# Patient Record
Sex: MALE | Race: WHITE | NOT HISPANIC OR LATINO | Employment: PART TIME | URBAN - NONMETROPOLITAN AREA
[De-identification: names, ages, dates, MRNs, and addresses within clinical notes are randomized per-mention and may not be internally consistent; named-entity substitution may affect disease eponyms.]

---

## 2021-02-14 ENCOUNTER — APPOINTMENT (INPATIENT)
Dept: CT IMAGING | Facility: HOSPITAL | Age: 66
DRG: 376 | End: 2021-02-14
Payer: COMMERCIAL

## 2021-02-14 ENCOUNTER — HOSPITAL ENCOUNTER (INPATIENT)
Facility: HOSPITAL | Age: 66
LOS: 1 days | Discharge: HOME | DRG: 376 | End: 2021-02-15
Attending: EMERGENCY MEDICINE | Admitting: FAMILY MEDICINE
Payer: COMMERCIAL

## 2021-02-14 ENCOUNTER — APPOINTMENT (EMERGENCY)
Dept: CT IMAGING | Facility: HOSPITAL | Age: 66
DRG: 376 | End: 2021-02-14
Payer: COMMERCIAL

## 2021-02-14 DIAGNOSIS — K31.89 MASS OF GASTROESOPHAGEAL JUNCTION: Primary | ICD-10-CM

## 2021-02-14 DIAGNOSIS — Z72.0 TOBACCO ABUSE: ICD-10-CM

## 2021-02-14 DIAGNOSIS — I16.0 HYPERTENSIVE URGENCY: ICD-10-CM

## 2021-02-14 PROBLEM — R07.1 CHEST PAIN ON BREATHING: Status: ACTIVE | Noted: 2021-02-14

## 2021-02-14 PROBLEM — J90 PLEURAL EFFUSION ON RIGHT: Status: ACTIVE | Noted: 2021-02-14

## 2021-02-14 LAB
ALBUMIN SERPL BCP-MCNC: 3.2 G/DL (ref 3.5–5)
ALP SERPL-CCNC: 81 U/L (ref 46–116)
ALT SERPL W P-5'-P-CCNC: 18 U/L (ref 12–78)
ANION GAP SERPL CALCULATED.3IONS-SCNC: 10 MMOL/L (ref 4–13)
APTT PPP: 35 SECONDS (ref 23–37)
AST SERPL W P-5'-P-CCNC: 10 U/L (ref 5–45)
BASOPHILS # BLD AUTO: 0.08 THOUSANDS/ΜL (ref 0–0.1)
BASOPHILS NFR BLD AUTO: 1 % (ref 0–1)
BILIRUB SERPL-MCNC: 0.41 MG/DL (ref 0.2–1)
BILIRUB UR QL STRIP: NEGATIVE
BUN SERPL-MCNC: 9 MG/DL (ref 5–25)
CALCIUM ALBUM COR SERPL-MCNC: 9 MG/DL (ref 8.3–10.1)
CALCIUM SERPL-MCNC: 8.4 MG/DL (ref 8.3–10.1)
CHLORIDE SERPL-SCNC: 102 MMOL/L (ref 100–108)
CLARITY UR: CLEAR
CO2 SERPL-SCNC: 28 MMOL/L (ref 21–32)
COLOR UR: YELLOW
CREAT SERPL-MCNC: 0.95 MG/DL (ref 0.6–1.3)
EOSINOPHIL # BLD AUTO: 0.43 THOUSAND/ΜL (ref 0–0.61)
EOSINOPHIL NFR BLD AUTO: 5 % (ref 0–6)
ERYTHROCYTE [DISTWIDTH] IN BLOOD BY AUTOMATED COUNT: 12.7 % (ref 11.6–15.1)
GFR SERPL CREATININE-BSD FRML MDRD: 83 ML/MIN/1.73SQ M
GLUCOSE SERPL-MCNC: 94 MG/DL (ref 65–140)
GLUCOSE UR STRIP-MCNC: NEGATIVE MG/DL
HCT VFR BLD AUTO: 45.9 % (ref 36.5–49.3)
HGB BLD-MCNC: 15.2 G/DL (ref 12–17)
HGB UR QL STRIP.AUTO: NEGATIVE
IMM GRANULOCYTES # BLD AUTO: 0.02 THOUSAND/UL (ref 0–0.2)
IMM GRANULOCYTES NFR BLD AUTO: 0 % (ref 0–2)
INR PPP: 1.05 (ref 0.84–1.19)
KETONES UR STRIP-MCNC: NEGATIVE MG/DL
LACTATE SERPL-SCNC: 0.8 MMOL/L (ref 0.5–2)
LEUKOCYTE ESTERASE UR QL STRIP: NEGATIVE
LIPASE SERPL-CCNC: 109 U/L (ref 73–393)
LYMPHOCYTES # BLD AUTO: 1.78 THOUSANDS/ΜL (ref 0.6–4.47)
LYMPHOCYTES NFR BLD AUTO: 22 % (ref 14–44)
MAGNESIUM SERPL-MCNC: 2.1 MG/DL (ref 1.6–2.6)
MCH RBC QN AUTO: 30.3 PG (ref 26.8–34.3)
MCHC RBC AUTO-ENTMCNC: 33.1 G/DL (ref 31.4–37.4)
MCV RBC AUTO: 91 FL (ref 82–98)
MONOCYTES # BLD AUTO: 0.92 THOUSAND/ΜL (ref 0.17–1.22)
MONOCYTES NFR BLD AUTO: 11 % (ref 4–12)
NEUTROPHILS # BLD AUTO: 4.87 THOUSANDS/ΜL (ref 1.85–7.62)
NEUTS SEG NFR BLD AUTO: 61 % (ref 43–75)
NITRITE UR QL STRIP: NEGATIVE
NRBC BLD AUTO-RTO: 0 /100 WBCS
PH UR STRIP.AUTO: 6 [PH]
PLATELET # BLD AUTO: 237 THOUSANDS/UL (ref 149–390)
PMV BLD AUTO: 8.6 FL (ref 8.9–12.7)
POTASSIUM SERPL-SCNC: 3.9 MMOL/L (ref 3.5–5.3)
PROT SERPL-MCNC: 7 G/DL (ref 6.4–8.2)
PROT UR STRIP-MCNC: NEGATIVE MG/DL
PROTHROMBIN TIME: 13.5 SECONDS (ref 11.6–14.5)
RBC # BLD AUTO: 5.02 MILLION/UL (ref 3.88–5.62)
SODIUM SERPL-SCNC: 140 MMOL/L (ref 136–145)
SP GR UR STRIP.AUTO: 1.02 (ref 1–1.03)
TROPONIN I SERPL-MCNC: <0.02 NG/ML
TSH SERPL DL<=0.05 MIU/L-ACNC: 5.31 UIU/ML (ref 0.36–3.74)
UROBILINOGEN UR QL STRIP.AUTO: 0.2 E.U./DL
WBC # BLD AUTO: 8.1 THOUSAND/UL (ref 4.31–10.16)

## 2021-02-14 PROCEDURE — 99285 EMERGENCY DEPT VISIT HI MDM: CPT

## 2021-02-14 PROCEDURE — 83605 ASSAY OF LACTIC ACID: CPT | Performed by: EMERGENCY MEDICINE

## 2021-02-14 PROCEDURE — 80053 COMPREHEN METABOLIC PANEL: CPT | Performed by: EMERGENCY MEDICINE

## 2021-02-14 PROCEDURE — 99285 EMERGENCY DEPT VISIT HI MDM: CPT | Performed by: EMERGENCY MEDICINE

## 2021-02-14 PROCEDURE — 71250 CT THORAX DX C-: CPT

## 2021-02-14 PROCEDURE — 99222 1ST HOSP IP/OBS MODERATE 55: CPT | Performed by: FAMILY MEDICINE

## 2021-02-14 PROCEDURE — 84484 ASSAY OF TROPONIN QUANT: CPT | Performed by: FAMILY MEDICINE

## 2021-02-14 PROCEDURE — 83735 ASSAY OF MAGNESIUM: CPT | Performed by: EMERGENCY MEDICINE

## 2021-02-14 PROCEDURE — 84443 ASSAY THYROID STIM HORMONE: CPT | Performed by: FAMILY MEDICINE

## 2021-02-14 PROCEDURE — 85025 COMPLETE CBC W/AUTO DIFF WBC: CPT | Performed by: EMERGENCY MEDICINE

## 2021-02-14 PROCEDURE — 85610 PROTHROMBIN TIME: CPT | Performed by: EMERGENCY MEDICINE

## 2021-02-14 PROCEDURE — C9113 INJ PANTOPRAZOLE SODIUM, VIA: HCPCS | Performed by: FAMILY MEDICINE

## 2021-02-14 PROCEDURE — G1004 CDSM NDSC: HCPCS

## 2021-02-14 PROCEDURE — 83690 ASSAY OF LIPASE: CPT | Performed by: EMERGENCY MEDICINE

## 2021-02-14 PROCEDURE — 36415 COLL VENOUS BLD VENIPUNCTURE: CPT | Performed by: EMERGENCY MEDICINE

## 2021-02-14 PROCEDURE — 85730 THROMBOPLASTIN TIME PARTIAL: CPT | Performed by: EMERGENCY MEDICINE

## 2021-02-14 PROCEDURE — 96374 THER/PROPH/DIAG INJ IV PUSH: CPT

## 2021-02-14 PROCEDURE — 93005 ELECTROCARDIOGRAM TRACING: CPT

## 2021-02-14 PROCEDURE — 81003 URINALYSIS AUTO W/O SCOPE: CPT | Performed by: EMERGENCY MEDICINE

## 2021-02-14 PROCEDURE — 96361 HYDRATE IV INFUSION ADD-ON: CPT

## 2021-02-14 PROCEDURE — 74177 CT ABD & PELVIS W/CONTRAST: CPT

## 2021-02-14 RX ORDER — MULTIVITAMIN WITH IRON
TABLET ORAL DAILY
COMMUNITY
End: 2021-02-15 | Stop reason: HOSPADM

## 2021-02-14 RX ORDER — FENTANYL CITRATE 50 UG/ML
25 INJECTION, SOLUTION INTRAMUSCULAR; INTRAVENOUS EVERY 8 HOURS PRN
Status: DISCONTINUED | OUTPATIENT
Start: 2021-02-14 | End: 2021-02-15 | Stop reason: HOSPADM

## 2021-02-14 RX ORDER — NICOTINE 21 MG/24HR
1 PATCH, TRANSDERMAL 24 HOURS TRANSDERMAL DAILY
Status: DISCONTINUED | OUTPATIENT
Start: 2021-02-15 | End: 2021-02-15 | Stop reason: HOSPADM

## 2021-02-14 RX ORDER — ONDANSETRON 2 MG/ML
4 INJECTION INTRAMUSCULAR; INTRAVENOUS EVERY 6 HOURS PRN
Status: DISCONTINUED | OUTPATIENT
Start: 2021-02-14 | End: 2021-02-15 | Stop reason: HOSPADM

## 2021-02-14 RX ORDER — FENTANYL CITRATE 50 UG/ML
50 INJECTION, SOLUTION INTRAMUSCULAR; INTRAVENOUS 3 TIMES DAILY PRN
Status: DISCONTINUED | OUTPATIENT
Start: 2021-02-14 | End: 2021-02-15 | Stop reason: HOSPADM

## 2021-02-14 RX ORDER — PROPRANOLOL/HYDROCHLOROTHIAZID 40 MG-25MG
TABLET ORAL DAILY
COMMUNITY
End: 2021-02-15 | Stop reason: HOSPADM

## 2021-02-14 RX ORDER — MAGNESIUM HYDROXIDE/ALUMINUM HYDROXICE/SIMETHICONE 120; 1200; 1200 MG/30ML; MG/30ML; MG/30ML
30 SUSPENSION ORAL EVERY 6 HOURS PRN
Status: DISCONTINUED | OUTPATIENT
Start: 2021-02-14 | End: 2021-02-15 | Stop reason: HOSPADM

## 2021-02-14 RX ORDER — ACETAMINOPHEN 325 MG/1
650 TABLET ORAL EVERY 6 HOURS PRN
Status: DISCONTINUED | OUTPATIENT
Start: 2021-02-14 | End: 2021-02-15 | Stop reason: HOSPADM

## 2021-02-14 RX ORDER — PANTOPRAZOLE SODIUM 40 MG/1
40 INJECTION, POWDER, FOR SOLUTION INTRAVENOUS EVERY 12 HOURS SCHEDULED
Status: DISCONTINUED | OUTPATIENT
Start: 2021-02-14 | End: 2021-02-15 | Stop reason: HOSPADM

## 2021-02-14 RX ORDER — HYDROCHLOROTHIAZIDE 12.5 MG/1
12.5 TABLET ORAL DAILY
Status: DISCONTINUED | OUTPATIENT
Start: 2021-02-14 | End: 2021-02-15 | Stop reason: HOSPADM

## 2021-02-14 RX ORDER — CALCIUM CARBONATE 200(500)MG
1000 TABLET,CHEWABLE ORAL DAILY PRN
Status: DISCONTINUED | OUTPATIENT
Start: 2021-02-14 | End: 2021-02-15 | Stop reason: HOSPADM

## 2021-02-14 RX ORDER — LANOLIN ALCOHOL/MO/W.PET/CERES
1.5 CREAM (GRAM) TOPICAL
Status: DISCONTINUED | OUTPATIENT
Start: 2021-02-14 | End: 2021-02-15 | Stop reason: HOSPADM

## 2021-02-14 RX ORDER — ONDANSETRON 2 MG/ML
4 INJECTION INTRAMUSCULAR; INTRAVENOUS ONCE
Status: COMPLETED | OUTPATIENT
Start: 2021-02-14 | End: 2021-02-14

## 2021-02-14 RX ORDER — FENTANYL CITRATE 50 UG/ML
25 INJECTION, SOLUTION INTRAMUSCULAR; INTRAVENOUS EVERY 4 HOURS PRN
Status: DISCONTINUED | OUTPATIENT
Start: 2021-02-14 | End: 2021-02-14

## 2021-02-14 RX ADMIN — PANTOPRAZOLE SODIUM 40 MG: 40 INJECTION, POWDER, FOR SOLUTION INTRAVENOUS at 22:06

## 2021-02-14 RX ADMIN — FENTANYL CITRATE 25 MCG: 50 INJECTION INTRAMUSCULAR; INTRAVENOUS at 22:06

## 2021-02-14 RX ADMIN — IOHEXOL 100 ML: 350 INJECTION, SOLUTION INTRAVENOUS at 14:54

## 2021-02-14 RX ADMIN — MELATONIN TAB 3 MG 1.5 MG: 3 TAB at 22:06

## 2021-02-14 RX ADMIN — SODIUM CHLORIDE 1000 ML: 0.9 INJECTION, SOLUTION INTRAVENOUS at 14:14

## 2021-02-14 RX ADMIN — ONDANSETRON 4 MG: 2 INJECTION INTRAMUSCULAR; INTRAVENOUS at 14:14

## 2021-02-14 RX ADMIN — HYDROCHLOROTHIAZIDE 12.5 MG: 12.5 TABLET ORAL at 17:23

## 2021-02-14 NOTE — H&P
H&P- Reno Fuentes 1955, 76 y o  male MRN: [id number]    Unit/Bed#: -01 Encounter: [id number]    Primary Care Provider: No primary care provider on file  Date and time admitted to hospital: 2/14/2021  1:57 PM        * Mass of gastroesophageal junction  Assessment & Plan  Patient complains of 30 lb weight loss over the last 3-4 months, poor appetite and pain after eating  Labs are normal including CMP and lipase  CT abdomen pelvis shows Enhancing nodular lesion measuring 2 6 x 1 8 cm the gastroesophageal junction on series 2 image 18 concerning for primary or metastatic neoplastic process  Keep NPO after midnight and consult placed to GI for EGD with biopsy of the area  Placed on Protonix 40 mg IV b i d  For now  Chest pain on breathing  Assessment & Plan  Patient complains of right lower chest and right upper abdominal area pain on eating or taking deep breaths  CT abdomen pelvis showed right lower lobe pleural effusion and GE junction nodular mass noted  Will do CT chest without contrast for further evaluation  Check EKG and troponin  Tylenol and fentanyl for pain control  Tobacco abuse  Assessment & Plan  Counseled on smoking cessation and placed on nicotine replacement therapy    Hypertensive urgency  Assessment & Plan  No prior diagnosis of hypertension  Blood pressure is elevated at this time  May be anxiety or uncontrolled or undiagnosed hypertension  Will initiate on HCTZ 12 5 mg oral daily and further adjust medications to better control blood pressure  VTE Prophylaxis: Enoxaparin (Lovenox)  / sequential compression device   Code Status:  Full code  POLST: There is no POLST form on file for this patient (pre-hospital)  Discussion with family:  Discussed with daughter    Anticipated Length of Stay:  Patient will be admitted on an Inpatient basis with an anticipated length of stay of  more than 2 midnights     Justification for Hospital Stay:  Right lower chest pain and unintentional weight loss    Total Time for Visit, including Counseling / Coordination of Care: 45 minutes  Greater than 50% of this total time spent on direct patient counseling and coordination of care  Chief Complaint:   Chest pain and abdominal pain    History of Present Illness:    Teodoro Claudio is a 76 y o  male who presents with chest pain and abdominal pain  Patient states that over the last 3-4 months he has noticed that he has poor appetite  Appears to be losing weight and has lost around 30 lb in the last 3-4 months  He also complains of pain and pressure after eating on the right upper part of the abdomen right lower chest area  Came to the ER to be evaluated today has the right sided pain has been constant for the last 3 weeks  Denies any fevers chills nausea vomiting or diarrhea  Had colonoscopies done on a regular basis every 5 years and never had endoscopy done  Prior history of heartburn treated with Zantac  Denies any NSAID abuse    Review of Systems:    Review of Systems   Constitutional: Positive for appetite change, fatigue and unexpected weight change  Negative for chills and fever  HENT: Negative for hearing loss, sore throat and trouble swallowing  Eyes: Negative for photophobia, discharge and visual disturbance  Respiratory: Positive for shortness of breath  Negative for chest tightness  Cardiovascular: Positive for chest pain  Negative for palpitations  Gastrointestinal: Negative for abdominal pain, blood in stool and vomiting  Endocrine: Negative for polydipsia and polyuria  Genitourinary: Negative for difficulty urinating, dysuria, flank pain and hematuria  Musculoskeletal: Negative for back pain and gait problem  Skin: Negative for rash  Allergic/Immunologic: Negative for environmental allergies and food allergies  Neurological: Negative for dizziness, seizures, syncope and headaches  Hematological: Does not bruise/bleed easily  Psychiatric/Behavioral: Negative for behavioral problems  All other systems reviewed and are negative  Past Medical and Surgical History:     History reviewed  No pertinent past medical history  Past Surgical History:   Procedure Laterality Date    PATELLAR TENDON REPAIR      SHOULDER SURGERY         Meds/Allergies:    Prior to Admission medications    Medication Sig Start Date End Date Taking? Authorizing Provider   psyllium (METAMUCIL) 58 6 % packet Take 1 packet by mouth daily   Yes Historical Provider, MD   Magnesium 250 MG TABS Take by mouth daily Patient hasn't been taking for 3 weeks secondary to abdominal pain    Historical Provider, MD   Turmeric 500 MG CAPS Take by mouth daily Patient hasn't been taking for 3 weeks secondary to abdominal pain    Historical Provider, MD     I have reviewed home medications with patient personally  Allergies: Allergies   Allergen Reactions    Codeine Vomiting       Social History:     Marital Status: /Civil Union   Social History     Substance and Sexual Activity   Alcohol Use Not Currently     Social History     Tobacco Use   Smoking Status Current Every Day Smoker    Packs/day: 1 50   Smokeless Tobacco Never Used     Social History     Substance and Sexual Activity   Drug Use Not Currently       Family History:    Family History   Problem Relation Age of Onset    Hypertension Father        Physical Exam:     Vitals:   Blood Pressure: (!) 184/78 (02/14/21 1646)  Pulse: 68 (02/14/21 1638)  Temperature: 98 5 °F (36 9 °C) (02/14/21 1638)  Temp Source: Oral (02/14/21 1551)  Respirations: 20 (02/14/21 1638)  Height: 5' 10" (177 8 cm) (02/14/21 1400)  Weight - Scale: 84 6 kg (186 lb 8 2 oz) (02/14/21 1400)  SpO2: 96 % (02/14/21 1638)    Physical Exam  Vitals signs and nursing note reviewed  Constitutional:       Appearance: Normal appearance  HENT:      Head: Normocephalic and atraumatic        Right Ear: External ear normal       Left Ear: External ear normal       Nose: Nose normal       Mouth/Throat:      Pharynx: Oropharynx is clear  Neck:      Musculoskeletal: Normal range of motion and neck supple  Cardiovascular:      Rate and Rhythm: Normal rate and regular rhythm  Heart sounds: Normal heart sounds  Pulmonary:      Effort: Pulmonary effort is normal       Comments: Good air entry bilaterally with decreased breath sounds on the right base  Abdominal:      General: Bowel sounds are normal       Palpations: Abdomen is soft  Tenderness: There is abdominal tenderness  Comments: Mild tenderness on palpation of the right upper quadrant   Musculoskeletal: Normal range of motion  Skin:     General: Skin is warm and dry  Capillary Refill: Capillary refill takes less than 2 seconds  Neurological:      General: No focal deficit present  Mental Status: He is alert and oriented to person, place, and time  Psychiatric:         Mood and Affect: Mood normal            Additional Data:     Lab Results: I have personally reviewed pertinent reports  Results from last 7 days   Lab Units 02/14/21  1409   WBC Thousand/uL 8 10   HEMOGLOBIN g/dL 15 2   HEMATOCRIT % 45 9   PLATELETS Thousands/uL 237   NEUTROS PCT % 61   LYMPHS PCT % 22   MONOS PCT % 11   EOS PCT % 5     Results from last 7 days   Lab Units 02/14/21  1409   SODIUM mmol/L 140   POTASSIUM mmol/L 3 9   CHLORIDE mmol/L 102   CO2 mmol/L 28   BUN mg/dL 9   CREATININE mg/dL 0 95   ANION GAP mmol/L 10   CALCIUM mg/dL 8 4   ALBUMIN g/dL 3 2*   TOTAL BILIRUBIN mg/dL 0 41   ALK PHOS U/L 81   ALT U/L 18   AST U/L 10   GLUCOSE RANDOM mg/dL 94     Results from last 7 days   Lab Units 02/14/21  1409   INR  1 05             Results from last 7 days   Lab Units 02/14/21  1409   LACTIC ACID mmol/L 0 8       Imaging: I have personally reviewed pertinent reports        CT abdomen pelvis with contrast   Final Result by Clarissa Soares MD (02/14 1530)   Enhancing nodular lesion measuring 2 6 x 1 8 cm the gastroesophageal junction on series 2 image 18 concerning for primary or metastatic neoplastic process  Additionally, there is a small right basilar pleural effusion with some nodular pleural    enhancement raising suspicion for a malignant effusion  Recommend PET CT follow-up to search for additional lesions and then consider tissue sampling  The study was marked in [facility] for immediate notification  Workstation performed: [id number]         CT chest wo contrast    (Results Pending)       EKG, Pathology, and Other Studies Reviewed on Admission:   · EKG:  EKG ordered  Allscripts / Epic Records Reviewed: Yes     ** Please Note: This note has been constructed using a voice recognition system   **

## 2021-02-14 NOTE — ASSESSMENT & PLAN NOTE
Patient complains of 30 lb weight loss over the last 3-4 months, poor appetite and pain after eating  Labs are normal including CMP and lipase  CT abdomen pelvis shows Enhancing nodular lesion measuring 2 6 x 1 8 cm the gastroesophageal junction on series 2 image 18 concerning for primary or metastatic neoplastic process  Keep NPO after midnight and consult placed to GI for EGD with biopsy of the area  Placed on Protonix 40 mg IV b i d  For now

## 2021-02-14 NOTE — ASSESSMENT & PLAN NOTE
Patient complains of right lower chest and right upper abdominal area pain on eating or taking deep breaths  CT abdomen pelvis showed right lower lobe pleural effusion and GE junction nodular mass noted  Will do CT chest without contrast for further evaluation  Check EKG and troponin  Tylenol and fentanyl for pain control

## 2021-02-14 NOTE — ED PROVIDER NOTES
History  Chief Complaint   Patient presents with    Flank Pain     Patient has had pain for three weeks to the posterior right lower flank  Patient complains of right-sided abdominal pain/right flank pain that has been constant for the past 3 weeks  Decreased appetite  Has nausea  States he has lost over 30 lb in the last 1 year however he was in a what he calls a funk when his wife was sick recently  No dysuria or frequency  Balance used to be regular but now not  No cough or cold symptoms  No shortness of breath  States he is nauseated for the past 3 weeks  History provided by:  Patient   used: No    Abdominal Pain  Pain location: Right-sided and right flank  Pain quality: aching    Pain radiates to:  Does not radiate  Pain severity:  Mild  Onset quality:  Gradual  Duration:  3 weeks  Timing:  Constant  Progression:  Waxing and waning  Chronicity:  New  Context: not diet changes, not previous surgeries and not sick contacts    Relieved by:  Nothing  Worsened by:  Nothing  Ineffective treatments:  None tried  Associated symptoms: anorexia and nausea    Associated symptoms: no chest pain, no chills, no cough, no diarrhea, no dysuria, no fatigue, no fever, no hematemesis, no hematochezia, no shortness of breath and no sore throat        None       History reviewed  No pertinent past medical history  History reviewed  No pertinent surgical history  History reviewed  No pertinent family history  I have reviewed and agree with the history as documented  E-Cigarette/Vaping    E-Cigarette Use Never User      E-Cigarette/Vaping Substances     Social History     Tobacco Use    Smoking status: Current Every Day Smoker     Packs/day: 1 50    Smokeless tobacco: Never Used   Substance Use Topics    Alcohol use: Not Currently    Drug use: Not Currently       Review of Systems   Constitutional: Negative for chills, diaphoresis, fatigue and fever     HENT: Negative for congestion, ear discharge, ear pain and sore throat  Eyes: Negative for pain, discharge and itching  Respiratory: Negative for cough, shortness of breath and wheezing  Cardiovascular: Negative for chest pain, palpitations and leg swelling  Gastrointestinal: Positive for abdominal pain, anorexia and nausea  Negative for diarrhea, hematemesis and hematochezia  Endocrine: Negative for polydipsia and polyphagia  Genitourinary: Negative for dysuria and frequency  Musculoskeletal: Negative for back pain and myalgias  Skin: Negative for color change and rash  Neurological: Negative for dizziness, light-headedness and headaches  Hematological: Does not bruise/bleed easily  Psychiatric/Behavioral: Negative for agitation, behavioral problems and confusion  All other systems reviewed and are negative  Physical Exam  Physical Exam  Vitals signs and nursing note reviewed  Constitutional:       Appearance: He is well-developed  HENT:      Head: Normocephalic and atraumatic  Eyes:      Pupils: Pupils are equal, round, and reactive to light  Neck:      Musculoskeletal: Normal range of motion and neck supple  Trachea: No tracheal deviation  Cardiovascular:      Rate and Rhythm: Normal rate and regular rhythm  Heart sounds: Normal heart sounds  Pulmonary:      Effort: Pulmonary effort is normal       Breath sounds: Normal breath sounds  Abdominal:      General: Bowel sounds are normal  There is no distension  Palpations: Abdomen is soft  Tenderness: There is abdominal tenderness (Minimal mild tenderness in right lower quadrant right flank region  )  Musculoskeletal:         General: No tenderness or deformity  Skin:     General: Skin is warm and dry  Capillary Refill: Capillary refill takes less than 2 seconds  Neurological:      Mental Status: He is alert and oriented to person, place, and time  Cranial Nerves: No cranial nerve deficit           Vital Signs  ED Triage Vitals   Temperature Pulse Respirations Blood Pressure SpO2   02/14/21 1551 02/14/21 1400 02/14/21 1400 02/14/21 1400 02/14/21 1400   98 °F (36 7 °C) 74 18 (!) 174/80 95 %      Temp Source Heart Rate Source Patient Position - Orthostatic VS BP Location FiO2 (%)   02/14/21 1551 02/14/21 1400 02/14/21 1400 02/14/21 1400 --   Oral Monitor Lying Left arm       Pain Score       02/14/21 1400       5           Vitals:    02/14/21 1400 02/14/21 1551   BP: (!) 174/80 (!) 171/86   Pulse: 74 63   Patient Position - Orthostatic VS: Lying Lying         Visual Acuity      ED Medications  Medications   sodium chloride 0 9 % bolus 1,000 mL (0 mL Intravenous Stopped 2/14/21 1518)   ondansetron (ZOFRAN) injection 4 mg (4 mg Intravenous Given 2/14/21 1414)   iohexol (OMNIPAQUE) 350 MG/ML injection (MULTI-DOSE) 100 mL (100 mL Intravenous Given 2/14/21 1454)       Diagnostic Studies  Results Reviewed     Procedure Component Value Units Date/Time    Lactic acid [[id number]]  (Normal) Collected: 02/14/21 1409    Lab Status: Final result Specimen: Blood from Arm, Left Updated: 02/14/21 1442     LACTIC ACID 0 8 mmol/L     Narrative:      Result may be elevated if tourniquet was used during collection      Comprehensive metabolic panel [[id number]]  (Abnormal) Collected: 02/14/21 1409    Lab Status: Final result Specimen: Blood from Arm, Left Updated: 02/14/21 1436     Sodium 140 mmol/L      Potassium 3 9 mmol/L      Chloride 102 mmol/L      CO2 28 mmol/L      ANION GAP 10 mmol/L      BUN 9 mg/dL      Creatinine 0 95 mg/dL      Glucose 94 mg/dL      Calcium 8 4 mg/dL      Corrected Calcium 9 0 mg/dL      AST 10 U/L      ALT 18 U/L      Alkaline Phosphatase 81 U/L      Total Protein 7 0 g/dL      Albumin 3 2 g/dL      Total Bilirubin 0 41 mg/dL      eGFR 83 ml/min/1 73sq m     Narrative:      [place] guidelines for Chronic Kidney Disease (CKD):     Stage 1 with normal or high GFR (GFR > 90 mL/min/1 73 square meters)    Stage 2 Mild CKD (GFR = 60-89 mL/min/1 73 square meters)    Stage 3A Moderate CKD (GFR = 45-59 mL/min/1 73 square meters)    Stage 3B Moderate CKD (GFR = 30-44 mL/min/1 73 square meters)    Stage 4 Severe CKD (GFR = 15-29 mL/min/1 73 square meters)    Stage 5 End Stage CKD (GFR <15 mL/min/1 73 square meters)  Note: GFR calculation is accurate only with a steady state creatinine    Lipase [[id number]]  (Normal) Collected: 02/14/21 1409    Lab Status: Final result Specimen: Blood from Arm, Left Updated: 02/14/21 1436     Lipase 109 u/L     Magnesium [[id number]]  (Normal) Collected: 02/14/21 1409    Lab Status: Final result Specimen: Blood from Arm, Left Updated: 02/14/21 1436     Magnesium 2 1 mg/dL     Protime-INR [[id number]]  (Normal) Collected: 02/14/21 1409    Lab Status: Final result Specimen: Blood from Arm, Left Updated: 02/14/21 1433     Protime 13 5 seconds      INR 1 05    APTT [[id number]]  (Normal) Collected: 02/14/21 1409    Lab Status: Final result Specimen: Blood from Arm, Left Updated: 02/14/21 1433     PTT 35 seconds     UA w Reflex to Microscopic w Reflex to Culture [[id number]] Collected: 02/14/21 1409    Lab Status: Final result Specimen: Urine, Clean Catch Updated: 02/14/21 1423     Color, UA Yellow     Clarity, UA Clear     Specific [place], UA 1 020     pH, UA 6 0     Leukocytes, UA Negative     Nitrite, UA Negative     Protein, UA Negative mg/dl      Glucose, UA Negative mg/dl      Ketones, UA Negative mg/dl      Urobilinogen, UA 0 2 E U /dl      Bilirubin, UA Negative     Blood, UA Negative    CBC and differential [[id number]]  (Abnormal) Collected: 02/14/21 1409    Lab Status: Final result Specimen: Blood from Arm, Left Updated: 02/14/21 1421     WBC 8 10 Thousand/uL      RBC 5 02 Million/uL      Hemoglobin 15 2 g/dL      Hematocrit 45 9 %      MCV 91 fL      MCH 30 3 pg      MCHC 33 1 g/dL      RDW 12 7 %      MPV 8 6 fL      Platelets 004 Thousands/uL nRBC 0 /100 WBCs      Neutrophils Relative 61 %      Immat GRANS % 0 %      Lymphocytes Relative 22 %      Monocytes Relative 11 %      Eosinophils Relative 5 %      Basophils Relative 1 %      Neutrophils Absolute 4 87 Thousands/µL      Immature Grans Absolute 0 02 Thousand/uL      Lymphocytes Absolute 1 78 Thousands/µL      Monocytes Absolute 0 92 Thousand/µL      Eosinophils Absolute 0 43 Thousand/µL      Basophils Absolute 0 08 Thousands/µL                  CT abdomen pelvis with contrast   Final Result by Ju Nash MD (02/14 1530)   Enhancing nodular lesion measuring 2 6 x 1 8 cm the gastroesophageal junction on series 2 image 18 concerning for primary or metastatic neoplastic process  Additionally, there is a small right basilar pleural effusion with some nodular pleural    enhancement raising suspicion for a malignant effusion  Recommend PET CT follow-up to search for additional lesions and then consider tissue sampling  The study was marked in [facility] for immediate notification  Workstation performed: [id number]                    Procedures  Procedures         ED Course  ED Course as of Feb 14 1600   Juan J Colemanpee Feb 14, 2021   1538 D? W GI on call, Dr Emma Barrios  Admit to the hospitalist service  Keep NPO  MDM  Number of Diagnoses or Management Options     Amount and/or Complexity of Data Reviewed  Clinical lab tests: reviewed  Review and summarize past medical records: yes        Disposition  Final diagnoses:    Mass of gastroesophageal junction     Time reflects when diagnosis was documented in both MDM as applicable and the Disposition within this note     Time User Action Codes Description Comment    2/14/2021  3:46 PM Jasmine Troncoso Add [K02 77] Mass of gastroesophageal junction       ED Disposition     ED Disposition Condition Date/Time Comment    Admit Stable Mouna Feb 14, 2021  3:47 PM Case was discussed with Dr Evan Maldonado and the patient's admission status was agreed to be to the service of Dr Elise Saha   Follow-up Information    None         Patient's Medications    No medications on file     No discharge procedures on file      PDMP Review     None          ED Provider  Electronically Signed by           Hailey Abreu MD  02/14/21 1301 [facility] Joanne Saavedra MD  02/14/21 [id number]

## 2021-02-14 NOTE — PLAN OF CARE
Problem: PAIN - ADULT  Goal: Verbalizes/displays adequate comfort level or baseline comfort level  Description: Interventions:  - Encourage patient to monitor pain and request assistance  - Assess pain using appropriate pain scale  - Administer analgesics based on type and severity of pain and evaluate response  - Implement non-pharmacological measures as appropriate and evaluate response  - Consider cultural and social influences on pain and pain management  - Notify physician/advanced practitioner if interventions unsuccessful or patient reports new pain  Outcome: Progressing     Problem: INFECTION - ADULT  Goal: Absence or prevention of progression during hospitalization  Description: INTERVENTIONS:  - Assess and monitor for signs and symptoms of infection  - Monitor lab/diagnostic results  - Monitor all insertion sites, i e  indwelling lines, tubes, and drains  - Monitor endotracheal if appropriate and nasal secretions for changes in amount and color  - [place] appropriate cooling/warming therapies per order  - Administer medications as ordered  - Instruct and encourage patient and family to use good hand hygiene technique  - Identify and instruct in appropriate isolation precautions for identified infection/condition  Outcome: Progressing     Problem: SAFETY ADULT  Goal: Patient will remain free of falls  Description: INTERVENTIONS:  - Assess patient frequently for physical needs  -  Identify cognitive and physical deficits and behaviors that affect risk of falls    -  [place] fall precautions as indicated by assessment   - Educate patient/family on patient safety including physical limitations  - Instruct patient to call for assistance with activity based on assessment  - Modify environment to reduce risk of injury  - Consider OT/PT consult to assist with strengthening/mobility  Outcome: Progressing  Goal: Maintain or return to baseline ADL function  Description: INTERVENTIONS:  -  Assess patient's ability to carry out ADLs; assess patient's baseline for ADL function and identify physical deficits which impact ability to perform ADLs (bathing, care of mouth/teeth, toileting, grooming, dressing, etc )  - Assess/evaluate cause of self-care deficits   - Assess range of motion  - Assess patient's mobility; develop plan if impaired  - Assess patient's need for assistive devices and provide as appropriate  - Encourage maximum independence but intervene and supervise when necessary  - Involve family in performance of ADLs  - Assess for home care needs following discharge   - Consider OT consult to assist with ADL evaluation and planning for discharge  - Provide patient education as appropriate  Outcome: Progressing  Goal: Maintain or return mobility status to optimal level  Description: INTERVENTIONS:  - Assess patient's baseline mobility status (ambulation, transfers, stairs, etc )    - Identify cognitive and physical deficits and behaviors that affect mobility  - Identify mobility aids required to assist with transfers and/or ambulation (gait belt, sit-to-stand, lift, walker, cane, etc )  - [place] fall precautions as indicated by assessment  - Record patient progress and toleration of activity level on Mobility SBAR; progress patient to next Phase/Stage  - Instruct patient to call for assistance with activity based on assessment  - Consider rehabilitation consult to assist with strengthening/weightbearing, etc   Outcome: Progressing     Problem: DISCHARGE PLANNING  Goal: Discharge to home or other facility with appropriate resources  Description: INTERVENTIONS:  - Identify barriers to discharge w/patient and caregiver  - Arrange for needed discharge resources and transportation as appropriate  - Identify discharge learning needs (meds, wound care, etc )  - Arrange for interpretive services to assist at discharge as needed  - Refer to Case Management Department for coordinating discharge planning if the patient needs post-hospital services based on physician/advanced practitioner order or complex needs related to functional status, cognitive ability, or social support system  Outcome: Progressing     Problem: Knowledge Deficit  Goal: Patient/family/caregiver demonstrates understanding of disease process, treatment plan, medications, and discharge instructions  Description: Complete learning assessment and assess knowledge base  Interventions:  - Provide teaching at level of understanding  - Provide teaching via preferred learning methods  Outcome: Progressing     Problem: Nutrition/Hydration-ADULT  Goal: Nutrient/Hydration intake appropriate for improving, restoring or maintaining nutritional needs  Description: Monitor and assess patient's nutrition/hydration status for malnutrition  Collaborate with interdisciplinary team and initiate plan and interventions as ordered  Monitor patient's weight and dietary intake as ordered or per policy  Utilize nutrition screening tool and intervene as necessary  Determine patient's food preferences and provide high-protein, high-caloric foods as appropriate       INTERVENTIONS:  - Monitor oral intake, urinary output, labs, and treatment plans  - Assess nutrition and hydration status and recommend course of action  - Evaluate amount of meals eaten  - Assist patient with eating if necessary   - Allow adequate time for meals  - Recommend/ encourage appropriate diets, oral nutritional supplements, and vitamin/mineral supplements  - Order, calculate, and assess calorie counts as needed  - Recommend, monitor, and adjust tube feedings and TPN/PPN based on assessed needs  - Assess need for intravenous fluids  - Provide specific nutrition/hydration education as appropriate  - Include patient/family/caregiver in decisions related to nutrition  Outcome: Progressing     Problem: Nutrition/Hydration-ADULT  Goal: Nutrient/Hydration intake appropriate for improving, restoring or maintaining nutritional needs  Description: Monitor and assess patient's nutrition/hydration status for malnutrition  Collaborate with interdisciplinary team and initiate plan and interventions as ordered  Monitor patient's weight and dietary intake as ordered or per policy  Utilize nutrition screening tool and intervene as necessary  Determine patient's food preferences and provide high-protein, high-caloric foods as appropriate       INTERVENTIONS:  - Monitor oral intake, urinary output, labs, and treatment plans  - Assess nutrition and hydration status and recommend course of action  - Evaluate amount of meals eaten  - Assist patient with eating if necessary   - Allow adequate time for meals  - Recommend/ encourage appropriate diets, oral nutritional supplements, and vitamin/mineral supplements  - Order, calculate, and assess calorie counts as needed  - Recommend, monitor, and adjust tube feedings and TPN/PPN based on assessed needs  - Assess need for intravenous fluids  - Provide specific nutrition/hydration education as appropriate  - Include patient/family/caregiver in decisions related to nutrition  Outcome: Progressing

## 2021-02-14 NOTE — ASSESSMENT & PLAN NOTE
No prior diagnosis of hypertension  Blood pressure is elevated at this time  May be anxiety or uncontrolled or undiagnosed hypertension  Will initiate on HCTZ 12 5 mg oral daily and further adjust medications to better control blood pressure

## 2021-02-15 ENCOUNTER — ANESTHESIA EVENT (INPATIENT)
Dept: GASTROENTEROLOGY | Facility: HOSPITAL | Age: 66
DRG: 376 | End: 2021-02-15
Payer: COMMERCIAL

## 2021-02-15 ENCOUNTER — APPOINTMENT (INPATIENT)
Dept: GASTROENTEROLOGY | Facility: HOSPITAL | Age: 66
DRG: 376 | End: 2021-02-15
Attending: INTERNAL MEDICINE
Payer: COMMERCIAL

## 2021-02-15 ENCOUNTER — ANESTHESIA (INPATIENT)
Dept: GASTROENTEROLOGY | Facility: HOSPITAL | Age: 66
DRG: 376 | End: 2021-02-15
Payer: COMMERCIAL

## 2021-02-15 VITALS
OXYGEN SATURATION: 95 % | TEMPERATURE: 98.5 F | WEIGHT: 186 LBS | DIASTOLIC BLOOD PRESSURE: 74 MMHG | HEART RATE: 59 BPM | RESPIRATION RATE: 18 BRPM | SYSTOLIC BLOOD PRESSURE: 137 MMHG | HEIGHT: 70 IN | BODY MASS INDEX: 26.63 KG/M2

## 2021-02-15 VITALS — HEART RATE: 60 BPM

## 2021-02-15 PROCEDURE — 0DB38ZX EXCISION OF LOWER ESOPHAGUS, VIA NATURAL OR ARTIFICIAL OPENING ENDOSCOPIC, DIAGNOSTIC: ICD-10-PCS | Performed by: INTERNAL MEDICINE

## 2021-02-15 PROCEDURE — 88341 IMHCHEM/IMCYTCHM EA ADD ANTB: CPT | Performed by: PATHOLOGY

## 2021-02-15 PROCEDURE — 88367 INSITU HYBRIDIZATION AUTO: CPT | Performed by: PATHOLOGY

## 2021-02-15 PROCEDURE — 88342 IMHCHEM/IMCYTCHM 1ST ANTB: CPT | Performed by: PATHOLOGY

## 2021-02-15 PROCEDURE — 88305 TISSUE EXAM BY PATHOLOGIST: CPT | Performed by: PATHOLOGY

## 2021-02-15 PROCEDURE — 88360 TUMOR IMMUNOHISTOCHEM/MANUAL: CPT | Performed by: PATHOLOGY

## 2021-02-15 PROCEDURE — 88313 SPECIAL STAINS GROUP 2: CPT | Performed by: PATHOLOGY

## 2021-02-15 PROCEDURE — C9113 INJ PANTOPRAZOLE SODIUM, VIA: HCPCS | Performed by: FAMILY MEDICINE

## 2021-02-15 PROCEDURE — 99239 HOSP IP/OBS DSCHRG MGMT >30: CPT | Performed by: FAMILY MEDICINE

## 2021-02-15 RX ORDER — CALCIUM CARBONATE 200(500)MG
1000 TABLET,CHEWABLE ORAL DAILY PRN
Refills: 0
Start: 2021-02-15

## 2021-02-15 RX ORDER — NICOTINE 21 MG/24HR
1 PATCH, TRANSDERMAL 24 HOURS TRANSDERMAL DAILY
Qty: 28 PATCH | Refills: 0 | Status: SHIPPED | OUTPATIENT
Start: 2021-02-16

## 2021-02-15 RX ORDER — SODIUM CHLORIDE, SODIUM LACTATE, POTASSIUM CHLORIDE, CALCIUM CHLORIDE 600; 310; 30; 20 MG/100ML; MG/100ML; MG/100ML; MG/100ML
INJECTION, SOLUTION INTRAVENOUS CONTINUOUS PRN
Status: DISCONTINUED | OUTPATIENT
Start: 2021-02-15 | End: 2021-02-15

## 2021-02-15 RX ORDER — ONDANSETRON 4 MG/1
4 TABLET, ORALLY DISINTEGRATING ORAL EVERY 6 HOURS PRN
Qty: 20 TABLET | Refills: 0 | Status: SHIPPED | OUTPATIENT
Start: 2021-02-15

## 2021-02-15 RX ORDER — ACETAMINOPHEN 325 MG/1
650 TABLET ORAL EVERY 6 HOURS PRN
Refills: 0
Start: 2021-02-15

## 2021-02-15 RX ORDER — PROPOFOL 10 MG/ML
INJECTION, EMULSION INTRAVENOUS AS NEEDED
Status: DISCONTINUED | OUTPATIENT
Start: 2021-02-15 | End: 2021-02-15

## 2021-02-15 RX ORDER — AMLODIPINE BESYLATE 5 MG/1
5 TABLET ORAL DAILY
Qty: 30 TABLET | Refills: 0 | Status: SHIPPED | OUTPATIENT
Start: 2021-02-15

## 2021-02-15 RX ORDER — ASCORBIC ACID 500 MG
1.5 TABLET ORAL
Refills: 0
Start: 2021-02-15

## 2021-02-15 RX ORDER — LIDOCAINE HYDROCHLORIDE 10 MG/ML
INJECTION, SOLUTION EPIDURAL; INFILTRATION; INTRACAUDAL; PERINEURAL AS NEEDED
Status: DISCONTINUED | OUTPATIENT
Start: 2021-02-15 | End: 2021-02-15

## 2021-02-15 RX ORDER — SODIUM CHLORIDE, SODIUM LACTATE, POTASSIUM CHLORIDE, CALCIUM CHLORIDE 600; 310; 30; 20 MG/100ML; MG/100ML; MG/100ML; MG/100ML
50 INJECTION, SOLUTION INTRAVENOUS CONTINUOUS
Status: CANCELLED | OUTPATIENT
Start: 2021-02-15

## 2021-02-15 RX ORDER — PANTOPRAZOLE SODIUM 40 MG/1
40 TABLET, DELAYED RELEASE ORAL DAILY
Qty: 30 TABLET | Refills: 0 | Status: SHIPPED | OUTPATIENT
Start: 2021-02-15

## 2021-02-15 RX ADMIN — SODIUM CHLORIDE, SODIUM LACTATE, POTASSIUM CHLORIDE, AND CALCIUM CHLORIDE: .6; .31; .03; .02 INJECTION, SOLUTION INTRAVENOUS at 10:15

## 2021-02-15 RX ADMIN — Medication 1 PATCH: at 08:44

## 2021-02-15 RX ADMIN — PROPOFOL 150 MG: 10 INJECTION, EMULSION INTRAVENOUS at 10:56

## 2021-02-15 RX ADMIN — PROPOFOL 20 MG: 10 INJECTION, EMULSION INTRAVENOUS at 11:02

## 2021-02-15 RX ADMIN — HYDROCHLOROTHIAZIDE 12.5 MG: 12.5 TABLET ORAL at 08:44

## 2021-02-15 RX ADMIN — PANTOPRAZOLE SODIUM 40 MG: 40 INJECTION, POWDER, FOR SOLUTION INTRAVENOUS at 08:44

## 2021-02-15 RX ADMIN — LIDOCAINE HYDROCHLORIDE 50 MG: 10 INJECTION, SOLUTION EPIDURAL; INFILTRATION; INTRACAUDAL; PERINEURAL at 10:56

## 2021-02-15 RX ADMIN — PROPOFOL 30 MG: 10 INJECTION, EMULSION INTRAVENOUS at 11:00

## 2021-02-15 NOTE — ASSESSMENT & PLAN NOTE
No prior diagnosis of hypertension  Blood pressure is elevated at this time  May be anxiety or uncontrolled or undiagnosed hypertension    Received 1 dose of HCTZ yesterday however due to poor oral intake I will place him on Norvasc 5 mg daily for now upon discharge

## 2021-02-15 NOTE — PLAN OF CARE
Problem: PAIN - ADULT  Goal: Verbalizes/displays adequate comfort level or baseline comfort level  Description: Interventions:  - Encourage patient to monitor pain and request assistance  - Assess pain using appropriate pain scale  - Administer analgesics based on type and severity of pain and evaluate response  - Implement non-pharmacological measures as appropriate and evaluate response  - Consider cultural and social influences on pain and pain management  - Notify physician/advanced practitioner if interventions unsuccessful or patient reports new pain  Outcome: Not Progressing     Problem: INFECTION - ADULT  Goal: Absence or prevention of progression during hospitalization  Description: INTERVENTIONS:  - Assess and monitor for signs and symptoms of infection  - Monitor lab/diagnostic results  - Monitor all insertion sites, i e  indwelling lines, tubes, and drains  - Monitor endotracheal if appropriate and nasal secretions for changes in amount and color  - [place] appropriate cooling/warming therapies per order  - Administer medications as ordered  - Instruct and encourage patient and family to use good hand hygiene technique  - Identify and instruct in appropriate isolation precautions for identified infection/condition  Outcome: Not Progressing     Problem: SAFETY ADULT  Goal: Patient will remain free of falls  Description: INTERVENTIONS:  - Assess patient frequently for physical needs  -  Identify cognitive and physical deficits and behaviors that affect risk of falls    -  [place] fall precautions as indicated by assessment   - Educate patient/family on patient safety including physical limitations  - Instruct patient to call for assistance with activity based on assessment  - Modify environment to reduce risk of injury  - Consider OT/PT consult to assist with strengthening/mobility  Outcome: Not Progressing  Goal: Maintain or return to baseline ADL function  Description: INTERVENTIONS:  -  Assess patient's ability to carry out ADLs; assess patient's baseline for ADL function and identify physical deficits which impact ability to perform ADLs (bathing, care of mouth/teeth, toileting, grooming, dressing, etc )  - Assess/evaluate cause of self-care deficits   - Assess range of motion  - Assess patient's mobility; develop plan if impaired  - Assess patient's need for assistive devices and provide as appropriate  - Encourage maximum independence but intervene and supervise when necessary  - Involve family in performance of ADLs  - Assess for home care needs following discharge   - Consider OT consult to assist with ADL evaluation and planning for discharge  - Provide patient education as appropriate  Outcome: Not Progressing  Goal: Maintain or return mobility status to optimal level  Description: INTERVENTIONS:  - Assess patient's baseline mobility status (ambulation, transfers, stairs, etc )    - Identify cognitive and physical deficits and behaviors that affect mobility  - Identify mobility aids required to assist with transfers and/or ambulation (gait belt, sit-to-stand, lift, walker, cane, etc )  - [place] fall precautions as indicated by assessment  - Record patient progress and toleration of activity level on Mobility SBAR; progress patient to next Phase/Stage  - Instruct patient to call for assistance with activity based on assessment  - Consider rehabilitation consult to assist with strengthening/weightbearing, etc   Outcome: Not Progressing     Problem: DISCHARGE PLANNING  Goal: Discharge to home or other facility with appropriate resources  Description: INTERVENTIONS:  - Identify barriers to discharge w/patient and caregiver  - Arrange for needed discharge resources and transportation as appropriate  - Identify discharge learning needs (meds, wound care, etc )  - Arrange for interpretive services to assist at discharge as needed  - Refer to Case Management Department for coordinating discharge planning if the patient needs post-hospital services based on physician/advanced practitioner order or complex needs related to functional status, cognitive ability, or social support system  Outcome: Not Progressing     Problem: Knowledge Deficit  Goal: Patient/family/caregiver demonstrates understanding of disease process, treatment plan, medications, and discharge instructions  Description: Complete learning assessment and assess knowledge base  Interventions:  - Provide teaching at level of understanding  - Provide teaching via preferred learning methods  Outcome: Not Progressing     Problem: Nutrition/Hydration-ADULT  Goal: Nutrient/Hydration intake appropriate for improving, restoring or maintaining nutritional needs  Description: Monitor and assess patient's nutrition/hydration status for malnutrition  Collaborate with interdisciplinary team and initiate plan and interventions as ordered  Monitor patient's weight and dietary intake as ordered or per policy  Utilize nutrition screening tool and intervene as necessary  Determine patient's food preferences and provide high-protein, high-caloric foods as appropriate       INTERVENTIONS:  - Monitor oral intake, urinary output, labs, and treatment plans  - Assess nutrition and hydration status and recommend course of action  - Evaluate amount of meals eaten  - Assist patient with eating if necessary   - Allow adequate time for meals  - Recommend/ encourage appropriate diets, oral nutritional supplements, and vitamin/mineral supplements  - Order, calculate, and assess calorie counts as needed  - Recommend, monitor, and adjust tube feedings and TPN/PPN based on assessed needs  - Assess need for intravenous fluids  - Provide specific nutrition/hydration education as appropriate  - Include patient/family/caregiver in decisions related to nutrition  Outcome: Not Progressing

## 2021-02-15 NOTE — ANESTHESIA POSTPROCEDURE EVALUATION
Post-Op Assessment Note    CV Status:  Stable  Pain Score: 0    Pain management: adequate     Mental Status:  Alert and awake   Hydration Status:  Euvolemic   PONV Controlled:  Controlled   Airway Patency:  Patent      Post Op Vitals Reviewed: Yes      Staff: CRNA         No complications documented      BP   108/57   Temp      Pulse 61   Resp 18   SpO2 99

## 2021-02-15 NOTE — DISCHARGE SUMMARY
Discharge- Jenny Rash 1955, 76 y o  male MRN: [id number]    Unit/Bed#: -01 Encounter: [id number]    Primary Care Provider: Adeola Chang   Date and time admitted to hospital: 2/14/2021  1:57 PM        * Mass of gastroesophageal junction  Assessment & Plan  Patient complains of 30 lb weight loss over the last 3-4 months, poor appetite and pain after eating  Labs are normal including CMP and lipase  CT abdomen pelvis shows Enhancing nodular lesion measuring 2 6 x 1 8 cm the gastroesophageal junction on series 2 image 18 concerning for primary or metastatic neoplastic process  EGD done today shows of 4 centimetre mass in the distal 3rd of the esophagus  Biopsies taken  CT chest shows right pleural effusion and 1 5 x 1 2 cm lower right paratracheal node  There is a large calcified node in the right hilum  Advised to follow-up with PCP and Heme-Onc outpatient  Biopsy results will be available in the next 7-10 days and further testing and treatment should be expedited outpatient  Chest pain on breathing  Assessment & Plan  Patient complains of right lower chest and right upper abdominal area pain on eating or taking deep breaths  CT abdomen pelvis showed right lower lobe pleural effusion and GE junction nodular mass noted  CT chest showed small right pleural effusion and lymph nodes at mentioned above  Tylenol and fentanyl for pain control  Noncardiac pain    Tobacco abuse  Assessment & Plan  Counseled on smoking cessation and placed on nicotine replacement therapy    Hypertensive urgency  Assessment & Plan  No prior diagnosis of hypertension  Blood pressure is elevated at this time  May be anxiety or uncontrolled or undiagnosed hypertension    Received 1 dose of HCTZ yesterday however due to poor oral intake I will place him on Norvasc 5 mg daily for now upon discharge      Discharging Physician / Practitioner: Ced Longoria MD  PCP: Adeola Chang  Admission Date:   Admission Orders (From admission, onward)     Ordered        02/14/21 1547  Inpatient Admission  Once                   Discharge Date: 02/15/21    Resolved Problems  Date Reviewed: 2/15/2021    None          Consultations During Hospital Stay:  · GI  Procedures Performed:   · EGD    Significant Findings / Test Results:   · 4 cm mass in the distal 3rd of the esophagus    Incidental Findings:   Ct Chest Wo Contrast    Result Date: 2/15/2021  Impression: There is asymmetric wall thickening involving the right hand aspect of the distal esophagus  Esophageal neoplasm should be excluded  Gastroenterology consultation and follow-up is recommended  There is an approximately 2 x 2 5 x 1 9 cm soft tissue density nodular lesion in the upper abdomen, just inferior to the GE junction and just to the right of the right hand margin of the gastric fundus  This is also concerning for neoplasm and may represent a gastrointestinal stromal tumor, metastatic node, metastatic implant, etc   Follow-up is recommended  Consider PET/CT for further evaluation  Small right pleural effusion  Please see discussion  Scattered areas of atelectasis are present bilaterally within the lungs, as described above  There are healing fractures of the left 5th through 7th ribs, anteriorly  Coronary artery disease  The study was marked in [facility] for immediate notification  Workstation performed: [id number]     Ct Abdomen Pelvis With Contrast    Result Date: 2/14/2021  Impression: Enhancing nodular lesion measuring 2 6 x 1 8 cm the gastroesophageal junction on series 2 image 18 concerning for primary or metastatic neoplastic process  Additionally, there is a small right basilar pleural effusion with some nodular pleural enhancement raising suspicion for a malignant effusion  Recommend PET CT follow-up to search for additional lesions and then consider tissue sampling  The study was marked in [facility] for immediate notification   Workstation performed: [id number]      Test Results Pending at Discharge (will require follow up): · Biopsy results of esophageal mass     Outpatient Tests Requested:  · Follow-up with PCP and Heme-Onc in the next 7-10 days  · Need to follow-up esophageal mass biopsy results as there is concern for malignancy    Complications:  None    Reason for Admission:  Abdominal pain    Hospital Course:     Bridgette Haywood is a 76 y o  male patient who originally presented to the hospital on 2/14/2021 due to right-sided abdominal pain and poor appetite and pain after eating  Patient also recently had a fall and noticed to have rib fractures but there is no pain at the site of rib fracture anymore   Patient has a 30 lb weight loss in the last 3-4 months  CT chest abdomen pelvis done which show concern for mass near the gastroesophageal junction  Also small right pleural effusion noted  EGD done today which shows a 4 cm mass in the distal 3rd of the esophagus  Biopsies done  Advised to follow up outpatient with Heme-Onc  Patient is from out of the area any states that he wants to go back to [facility] for treatment  Advised to get in touch with his PCP and get scheduled for oncology in his area as biopsy results will be available in the next 7-10 days and further treatment and testing should be pursued promptly  Strong suspicion for esophageal malignancy at this time  Also advised to eat soft foods and chew foods well to avoid any obstructions  Placed on Protonix 40 mg oral daily  Avoid any spicy deep fried or very hot foods    The patient, initially admitted to the hospital as inpatient, was discharged earlier than expected given the following: Rapid improvement  Please see above list of diagnoses and related plan for additional information  Condition at Discharge: good     Discharge Day Visit / Exam:     Subjective:  Patient denies any chest pain or shortness of breath or abdominal pain at this time  Just had his EGD done    Vitals: Blood Pressure: 137/74 (02/15/21 1155)  Pulse: 59 (02/15/21 1155)  Temperature: 98 5 °F (36 9 °C) (02/15/21 1155)  Temp Source: Oral (02/15/21 0913)  Respirations: 18 (02/15/21 1125)  Height: 5' 10" (177 8 cm) (02/15/21 0913)  Weight - Scale: 84 4 kg (186 lb) (02/15/21 0913)  SpO2: 95 % (02/15/21 1155)  Exam:   Physical Exam  Vitals signs and nursing note reviewed  Constitutional:       Appearance: Normal appearance  HENT:      Head: Normocephalic and atraumatic  Right Ear: External ear normal       Left Ear: External ear normal       Nose: Nose normal       Mouth/Throat:      Pharynx: Oropharynx is clear  Neck:      Musculoskeletal: Normal range of motion and neck supple  Cardiovascular:      Rate and Rhythm: Normal rate and regular rhythm  Heart sounds: Normal heart sounds  Pulmonary:      Effort: Pulmonary effort is normal       Breath sounds: Normal breath sounds  Comments: Mild decreased breath sounds on the right base  Abdominal:      General: Bowel sounds are normal       Palpations: Abdomen is soft  Tenderness: There is no abdominal tenderness  Musculoskeletal: Normal range of motion  Skin:     General: Skin is warm and dry  Capillary Refill: Capillary refill takes less than 2 seconds  Neurological:      General: No focal deficit present  Mental Status: He is alert and oriented to person, place, and time  Psychiatric:         Mood and Affect: Mood normal          Discussion with Family:  Discussed with daughter over the phone    Discharge instructions/Information to patient and family:   See after visit summary for information provided to patient and family  Provisions for Follow-Up Care:  See after visit summary for information related to follow-up care and any pertinent home health orders        Disposition:     Home    For Discharges to [facility] SNF:   · Not Applicable to this Patient - Not Applicable to this Patient    Planned Readmission: None     Discharge Statement:  I spent 35 minutes discharging the patient  This time was spent on the day of discharge  I had direct contact with the patient on the day of discharge  Greater than 50% of the total time was spent examining patient, answering all patient questions, arranging and discussing plan of care with patient as well as directly providing post-discharge instructions  Additional time then spent on discharge activities  Discharge Medications:  See after visit summary for reconciled discharge medications provided to patient and family        ** Please Note: This note has been constructed using a voice recognition system **

## 2021-02-15 NOTE — ASSESSMENT & PLAN NOTE
Patient complains of 30 lb weight loss over the last 3-4 months, poor appetite and pain after eating  Labs are normal including CMP and lipase  CT abdomen pelvis shows Enhancing nodular lesion measuring 2 6 x 1 8 cm the gastroesophageal junction on series 2 image 18 concerning for primary or metastatic neoplastic process  EGD done today shows of 4 centimetre mass in the distal 3rd of the esophagus  Biopsies taken  CT chest shows right pleural effusion and 1 5 x 1 2 cm lower right paratracheal node  There is a large calcified node in the right hilum  Advised to follow-up with PCP and Heme-Onc outpatient  Biopsy results will be available in the next 7-10 days and further testing and treatment should be expedited outpatient

## 2021-02-15 NOTE — DISCHARGE INSTR - AVS FIRST PAGE
Please eat good healthy nutritious foods avoid any fast foods or deep fried foods  Please chew your food well and make sure food is well cooked and cut into small pieces to avoid choking or blockage  Please follow-up with your family doctor and hematologist/oncologist in your area in the next 7-10 days to follow up on biopsy results

## 2021-02-15 NOTE — UTILIZATION REVIEW
Initial Clinical Review    Admission: Date/Time/Statement:   Admission Orders (From admission, onward)     Ordered        02/14/21 1547  Inpatient Admission  Once                   Orders Placed This Encounter   Procedures    Inpatient Admission     Standing Status:   Standing     Number of Occurrences:   1     Order Specific Question:   Level of Care     Answer:   Med Surg [16]     Order Specific Question:   Estimated length of stay     Answer:   More than 2 Midnights     Order Specific Question:   Certification     Answer:   I certify that inpatient services are medically necessary for this patient for a duration of greater than two midnights  See H&P and MD Progress Notes for additional information about the patient's course of treatment  ED Arrival Information     Expected Arrival Acuity Means of Arrival Escorted By Service Admission Type    - 2/14/2021 13:52 Urgent Walk-In Self Hospitalist Urgent    Arrival Complaint    Abd pain, no appetite, nausea        Chief Complaint   Patient presents with    Flank Pain     Patient has had pain for three weeks to the posterior right lower flank  Assessment/Plan: 76year old male to the ED from home with complaints of right sided flank pain  Admitted to inpatient for mass of gastroesophageal junction, chest pain on breathing  Has had poor appetite for past 3-4 months, and has lost about 30lbs in that time  Arrives with right lower quadrant tenderness and right flank pain  CT a/Pshows Enhancing nodular lesion measuring 2 6 x 1 8 cm the gastroesophageal junction on series 2 image 18 concerning for primary or metastatic neoplastic process  Right basilar pleural effusion suspicious for malignant effusion  Started on protonix IV  NPO for GI to obtain biopsy  2/15 GI consult: Has had dysphagia for years  Now with weight loss  Slight abdominal tenderness at Epigastrium       EGD results  DATE OF SERVICE:  2/15/21  ANESTHESIA INFORMATION:  ASA: II  Anesthesia Type: IV Sedation with Anesthesia     FINDINGS:  · Single fungating, malignant-appearing and ulcerated mass (traversable) measuring 40 mm in the lower third of the esophagus (38 cm from the incisors), covering one half of the circumference; performed partial removal by cold forceps biopsy  · The stomach and duodenum appeared normal      ED Triage Vitals   Temperature Pulse Respirations Blood Pressure SpO2   02/14/21 1551 02/14/21 1400 02/14/21 1400 02/14/21 1400 02/14/21 1400   98 °F (36 7 °C) 74 18 (!) 174/80 95 %      Temp Source Heart Rate Source Patient Position - Orthostatic VS BP Location FiO2 (%)   02/14/21 1551 02/14/21 1400 02/14/21 1400 02/14/21 1400 --   Oral Monitor Lying Left arm       Pain Score       02/14/21 1400       5          Wt Readings from Last 1 Encounters:   02/15/21 84 4 kg (186 lb)     Additional Vital Signs:   Date/Time  Temp Pulse  Resp  BP  MAP (mmHg)  SpO2  O2 Device Patient Position - Orthostatic VS   02/15/21 0913  98 5 °F (36 9 °C) 62  20  132/76  --  97 %  None (Room air) --   02/15/21 06:59:08  97 8 °F (36 6 °C) 73  17  145/82  103  93 %  -- --   02/14/21 21:59:19  98 9 °F (37 2 °C) 68  18  147/76  100  95 %  -- --   02/14/21 2000  -- --  --  --  --  --  None (Room air) --   02/14/21 1652  -- --  --  --  --  --  None (Room air) --   02/14/21 1646  -- --  --  184/78Abnormal   --  --  -- --   02/14/21 16:38:56  98 5 °F (36 9 °C) 68  20  182/103Abnormal   129  96 %  -- --   02/14/21 1551  98 °F (36 7 °C) 63  20  171/86Abnormal   --  96 %  None (Room air) Lying   02/14/21 1407  -- --  --  --  --  --  None (Room air) --   02/14/21 1400  -- 74  18  174/80Abnormal   --          Pertinent Labs/Diagnostic Test Results:   2/15 CT chest: There is asymmetric wall thickening involving the right hand aspect of the distal esophagus   Esophageal neoplasm should be excluded   Gastroenterology consultation and follow-up is recommended     There is an approximately 2 x 2 5 x 1 9 cm soft tissue density nodular lesion in the upper abdomen, just inferior to the GE junction and just to the right of the right hand margin of the gastric fundus   This is also concerning for neoplasm and may   represent a gastrointestinal stromal tumor, metastatic node, metastatic implant, etc   Follow-up is recommended   Consider PET/CT for further evaluation  Small right pleural effusion       Scattered areas of atelectasis are present bilaterally within the lungs, as described above  There are healing fractures of the left 5th through 7th ribs, anteriorly  Coronary artery disease     2/15 CT a/P: Enhancing nodular lesion measuring 2 6 x 1 8 cm the gastroesophageal junction on series 2 image 18 concerning for primary or metastatic neoplastic process   Additionally, there is a small right basilar pleural effusion with some nodular pleural   enhancement raising suspicion for a malignant effusion         Results from last 7 days   Lab Units 02/14/21  1409   WBC Thousand/uL 8 10   HEMOGLOBIN g/dL 15 2   HEMATOCRIT % 45 9   PLATELETS Thousands/uL 237   NEUTROS ABS Thousands/µL 4 87         Results from last 7 days   Lab Units 02/14/21  1409   SODIUM mmol/L 140   POTASSIUM mmol/L 3 9   CHLORIDE mmol/L 102   CO2 mmol/L 28   ANION GAP mmol/L 10   BUN mg/dL 9   CREATININE mg/dL 0 95   EGFR ml/min/1 73sq m 83   CALCIUM mg/dL 8 4   MAGNESIUM mg/dL 2 1     Results from last 7 days   Lab Units 02/14/21  1409   AST U/L 10   ALT U/L 18   ALK PHOS U/L 81   TOTAL PROTEIN g/dL 7 0   ALBUMIN g/dL 3 2*   TOTAL BILIRUBIN mg/dL 0 41         Results from last 7 days   Lab Units 02/14/21  1409   GLUCOSE RANDOM mg/dL 94       Results from last 7 days   Lab Units 02/14/21  1807   TROPONIN I ng/mL <0 02         Results from last 7 days   Lab Units 02/14/21  1409   PROTIME seconds 13 5   INR  1 05   PTT seconds 35     Results from last 7 days   Lab Units 02/14/21  1409   TSH 3RD GENERATON uIU/mL 5 312*         Results from last 7 days   Lab Units 02/14/21  1409   LACTIC ACID mmol/L 0 8       Results from last 7 days   Lab Units 02/14/21  1409   LIPASE u/L 109       Results from last 7 days   Lab Units 02/14/21  1409   CLARITY UA  Clear   COLOR UA  Yellow   SPEC GRAV UA  1 020   PH UA  6 0   GLUCOSE UA mg/dl Negative   KETONES UA mg/dl Negative   BLOOD UA  Negative   PROTEIN UA mg/dl Negative   NITRITE UA  Negative   BILIRUBIN UA  Negative   UROBILINOGEN UA E U /dl 0 2   LEUKOCYTES UA  Negative       ED Treatment:   Medication Administration from 02/14/2021 1346 to 02/14/2021 1626       Date/Time Order Dose Route Action     02/14/2021 1414 sodium chloride 0 9 % bolus 1,000 mL 1,000 mL Intravenous New Bag     02/14/2021 1414 ondansetron (ZOFRAN) injection 4 mg 4 mg Intravenous Given          Admitting Diagnosis: Flank pain [R10 9]  Mass of gastroesophageal junction [K31 89]  Age/Sex: 76 y o  male  Admission Orders:  SCDS   NPO  Scheduled Medications:  enoxaparin, 40 mg, Subcutaneous, Daily  hydrochlorothiazide, 12 5 mg, Oral, Daily  melatonin, 1 5 mg, Oral, HS  nicotine, 1 patch, Transdermal, Daily  pantoprazole, 40 mg, Intravenous, Q12H JENNIFER      Continuous IV Infusions:     PRN Meds:  acetaminophen, 650 mg, Oral, Q6H PRN  aluminum-magnesium hydroxide-simethicone, 30 mL, Oral, Q6H PRN  calcium carbonate, 1,000 mg, Oral, Daily PRN  fentanyl citrate (PF), 25 mcg, Intravenous, Q8H PRN  fentanyl citrate (PF), 50 mcg, Intravenous, TID PRN  ondansetron, 4 mg, Intravenous, Q6H PRN        IP CONSULT TO GASTROENTEROLOGY    Network Utilization Review Department  ATTENTION: Please call with any questions or concerns to [phone] and carefully listen to the prompts so that you are directed to the right person  All voicemails are confidential   Yazan Pascual all requests for admission clinical reviews, approved or denied determinations and any other requests to dedicated fax number below belonging to the campus where the patient is receiving treatment   List of dedicated fax numbers for the Facilities:  [address] [place] [address] DENIALS (Administrative/Medical Necessity) [phone]   [address] (Maternity/NICU/Pediatrics) 261 [facility],[address] Floor [address] Noah Castro [address] (  Daniela Garcia "Scarlett" 103) [address] [place] [address] [address]ian [address] [place] [address] [phone]

## 2021-02-15 NOTE — NURSING NOTE
IV removed  All belongings accounted for  AVS discussed with patient who verbalized understanding  Patient walked to ED for discharge where his car is parked

## 2021-02-15 NOTE — ASSESSMENT & PLAN NOTE
Patient complains of right lower chest and right upper abdominal area pain on eating or taking deep breaths  CT abdomen pelvis showed right lower lobe pleural effusion and GE junction nodular mass noted  CT chest showed small right pleural effusion and lymph nodes at mentioned above  Tylenol and fentanyl for pain control    Noncardiac pain

## 2021-02-15 NOTE — ANESTHESIA PREPROCEDURE EVALUATION
Procedure:  EGD    Relevant Problems   CARDIO   (+) Chest pain on breathing   (+) Hypertensive urgency      Other   (+) Mass of gastroesophageal junction   : Current Every Day Smoker     Physical Exam    Airway    Mallampati score: II  TM Distance: >3 FB  Neck ROM: full     Dental       Cardiovascular      Pulmonary      Other Findings        Anesthesia Plan  ASA Score- 2     Anesthesia Type- IV sedation with anesthesia with ASA Monitors  Additional Monitors:   Airway Plan:           Plan Factors-    Chart reviewed  Induction- intravenous  Postoperative Plan-     Informed Consent- Anesthetic plan and risks discussed with patient  I personally reviewed this patient with the CRNA  Discussed and agreed on the Anesthesia Plan with the CRNA  Jannette Chaudhary

## 2021-02-15 NOTE — CONSULTS
Flushed; Infusing 02/15/21 0915   Dressing Status Clean;Dry; Intact 02/15/21 0915   Dressing Change Due 02/18/21 02/14/21 2000   Reason Not Rotated Not due 02/14/21 2000       Physical Exam     Alert , pale , normal BP and R and HR  Skin; pale, no rash  HEENT: normal pinna  Normal hearing  No stridor  PUL; lungs clear bilaterally  Heart: S1S2  Abdomen: soft BS +, slight tenderness at epigastrium  Legs: no edema  Joints: no swelling  Neuro: normal speech, face symmetrical   Psych: normal affect  Normal memory  Eyes; normal sclera  Normal lids  Lab Results: I have personally reviewed pertinent reports  Imaging Studies: I have personally reviewed pertinent reports  EKG, Pathology, and Other Studies: I have personally reviewed pertinent reports  VTE Prophylaxis: Sequential compression device (Venodyne)     Counseling / Coordination of Care  Total floor / unit time spent today 30 minutes  Greater than 50% of total time was spent with the patient and / or family counseling and / or coordination of care   A description of the counseling / coordination of care:

## 2021-02-16 NOTE — UTILIZATION REVIEW
Notification of Discharge  This is a Notification of Discharge from our facility [address]  Please be advised that this patient has been discharge from our facility  Below you will find the admission and discharge date and time including the patients disposition  PRESENTATION DATE: 2/14/2021  1:57 PM  OBS ADMISSION DATE:   IP ADMISSION DATE: 2/14/21 1547   DISCHARGE DATE: 2/15/2021  2:34 PM  DISPOSITION: Home/Self Care Home/Self Care   Admission Orders listed below:  Admission Orders (From admission, onward)     Ordered        02/14/21 1547  Inpatient Admission  Once                   Please contact the UR Department if additional information is required to close this patient's authorization/case  [address] [employer] Utilization Review Department  Main: [phone] x carefully listen to the prompts  All voicemails are confidential   [email or web address]  Send all requests for admission clinical reviews, approved or denied determinations and any other requests to dedicated fax number below belonging to the campus where the patient is receiving treatment   List of dedicated fax numbers:  [address] DENIALS (Administrative/Medical Necessity) [phone]   [address] (Maternity/NICU/Pediatrics) [phone]   Londianal Golas [phone]   Sharon Counts [phone]   Yesica Loud [phone]   [facility] [address] [phone]   [facility]  [phone]   [address] [facility] [address]  [address] [facility] [address] [facility] [phone]

## 2021-02-20 LAB
ATRIAL RATE: 71 BPM
P AXIS: 21 DEGREES
PR INTERVAL: 180 MS
QRS AXIS: 50 DEGREES
QRSD INTERVAL: 98 MS
QT INTERVAL: 406 MS
QTC INTERVAL: 441 MS
T WAVE AXIS: 65 DEGREES
VENTRICULAR RATE: 71 BPM

## 2021-02-20 PROCEDURE — 93010 ELECTROCARDIOGRAM REPORT: CPT | Performed by: INTERNAL MEDICINE
